# Patient Record
Sex: FEMALE | Race: WHITE | NOT HISPANIC OR LATINO | Employment: PART TIME | URBAN - METROPOLITAN AREA
[De-identification: names, ages, dates, MRNs, and addresses within clinical notes are randomized per-mention and may not be internally consistent; named-entity substitution may affect disease eponyms.]

---

## 2024-05-29 ENCOUNTER — VIRTUAL VISIT (OUTPATIENT)
Dept: BEHAVIORAL HEALTH | Facility: CLINIC | Age: 34
End: 2024-05-29
Payer: COMMERCIAL

## 2024-05-29 DIAGNOSIS — F31.81 BIPOLAR 2 DISORDER (H): Primary | ICD-10-CM

## 2024-05-29 PROCEDURE — 90791 PSYCH DIAGNOSTIC EVALUATION: CPT | Mod: 95 | Performed by: SOCIAL WORKER

## 2024-05-29 ASSESSMENT — COLUMBIA-SUICIDE SEVERITY RATING SCALE - C-SSRS
6. HAVE YOU EVER DONE ANYTHING, STARTED TO DO ANYTHING, OR PREPARED TO DO ANYTHING TO END YOUR LIFE?: NO
TOTAL  NUMBER OF INTERRUPTED ATTEMPTS LIFETIME: NO
2. HAVE YOU ACTUALLY HAD ANY THOUGHTS OF KILLING YOURSELF?: NO
ATTEMPT LIFETIME: NO
TOTAL  NUMBER OF ABORTED OR SELF INTERRUPTED ATTEMPTS LIFETIME: NO
1. HAVE YOU WISHED YOU WERE DEAD OR WISHED YOU COULD GO TO SLEEP AND NOT WAKE UP?: NO

## 2024-05-29 ASSESSMENT — ANXIETY QUESTIONNAIRES
GAD7 TOTAL SCORE: 1
GAD7 TOTAL SCORE: 1
2. NOT BEING ABLE TO STOP OR CONTROL WORRYING: NOT AT ALL
5. BEING SO RESTLESS THAT IT IS HARD TO SIT STILL: NOT AT ALL
IF YOU CHECKED OFF ANY PROBLEMS ON THIS QUESTIONNAIRE, HOW DIFFICULT HAVE THESE PROBLEMS MADE IT FOR YOU TO DO YOUR WORK, TAKE CARE OF THINGS AT HOME, OR GET ALONG WITH OTHER PEOPLE: NOT DIFFICULT AT ALL
6. BECOMING EASILY ANNOYED OR IRRITABLE: NOT AT ALL
3. WORRYING TOO MUCH ABOUT DIFFERENT THINGS: SEVERAL DAYS
7. FEELING AFRAID AS IF SOMETHING AWFUL MIGHT HAPPEN: NOT AT ALL
1. FEELING NERVOUS, ANXIOUS, OR ON EDGE: NOT AT ALL

## 2024-05-29 ASSESSMENT — PATIENT HEALTH QUESTIONNAIRE - PHQ9: 5. POOR APPETITE OR OVEREATING: NOT AT ALL

## 2024-05-29 NOTE — PROGRESS NOTES
[facility] Psychiatry Services [facility]         PATIENT'S NAME: Tomi Parada  PREFERRED NAME: Tomi  PRONOUNS:       MRN: [id number]  : 1990  ADDRESS: [address] [place] [id number]  [facility]  [id number]  DATE OF SERVICE: 24  START TIME: 315 pm  END TIME: 409 pm  PREFERRED PHONE: [phone]  May we leave a program related message: Yes  EMERGENCY CONTACT: was obtained Roni Irizarry (LATASHA)  SERVICE MODALITY:  this was supposed to be a video visit but there were tech issues so it was changed to a phone visit      Provider verified identity through the following two step process.  Patient provided:  Patient  and Patient address    Telemedicine Visit: The patient's condition can be safely assessed and treated via synchronous audio and visual telemedicine encounter.      Reason for Telemedicine Visit: Patient convenience (e.g. access to timely appointments / distance to available provider)    Originating Site (Patient Location): Patient's home    Distant Site (Provider Location): Provider Remote Setting- Home Office    Consent:  The patient/guardian has verbally consented to: the potential risks and benefits of telemedicine (video visit) versus in person care; bill my insurance or make self-payment for services provided; and responsibility for payment of non-covered services.     Patient would like the video invitation sent by:  My Chart    Mode of Communication:  Video Conference via Amwell    Distant Location (Provider):  Off-site    As the provider I attest to compliance with applicable laws and regulations related to telemedicine.    UNIVERSAL ADULT Mental Health DIAGNOSTIC ASSESSMENT    Identifying Information:  Patient is a 33 year old,    individual.  Patient was referred for an assessment by  [facility] .  Patient attended the session alone.     Chief Complaint:   The reason for seeking services at this time is: hx of bipolar  "disorder, lithium was low so provider increased it and changed the time pt was to take it. She was not taking the full dose because she didn't realize she needed to.  The problem(s) began in 2011. Patient has attempted to resolve these concerns in the past through outpt, hospitalized 10 years ago, 3 years ago and 05/2024, psychiatry with Jaylin Inman at [facility] reviewed assessment and the LOCUS with pt. She does not meet criteria for programmatic care. [facility] offered to make referral for counseling. Pt agreed.     Social/Family History:  Patient reported they grew up in  born in [place] and then moved to [place] when pt was 10 due to F's job. She returned to [place] in 12/2022 .  They were raised by biological parents.  Parents stayed ..Pt is an only child Patient reported that their childhood was \"loved, spoiled, supported\".  Patient described their current relationships with family of origin as good.      The patient describes their cultural background as middle [place].  Cultural influences and impact on patient's life structure, values, norms, and healthcare: none identified.  Contextual influences on patient's health include: none identified.  Cultural, Contextual, and socioeconomic factors do not affect the patient's access to services.  These factors will be addressed in the Preliminary Treatment plan.  Patient identified their preferred language to be English. Patient reported they do not  need the assistance of an  or other support involved in therapy.     Patient reported had no significant delays in developmental tasks.   Patient's highest education level was some college. Patient identified the following learning problems: none reported.  Modifications will not be used to assist communication in therapy.   Patient reports they are  able to understand written materials.    Patient reported the following relationship history LTR been together since 2013.  " Patient's current relationship status is partnered / significant other for 11 years.   Patient identified their sexual orientation as heterosexual.  Patient reported having zero child(ebonie). Patient identified partner, parents, and family, in laws  as part of their support system.  Patient identified the quality of these relationships as good.     Patient's current living/housing situation involves staying in own home/apartment.  They live with spouse, Dash and they report that housing is stable.     Patient is currently  umemployed. Had been working for [employer] but has been suspended due to a disagreement with a customer. She has appealed and hopes to have her account reinstated .  Patient reports their finances are obtained through spouse.  Patient does not identify finances as a current stressor.      Patient reported that they have not been involved with the legal system.   Patient denies being on probation / parole / under the jurisdiction of the court.    Patient's Strengths and Limitations:  Patient identified the following strengths or resources that will help them succeed in treatment: exercise routine, friends / good social support, family support, insight, intelligence, motivation, sober support group / recovery support , and strong social skills. Things that may interfere with the patient's success in treatment include: none identified.     Assessments:  The following assessments were completed by patient for this visit:  PHQ2:       5/29/2024     3:52 PM   PHQ-2 ( 1999 [employer])   Q1: Little interest or pleasure in doing things 1   Q2: Feeling down, depressed or hopeless 1   PHQ-2 Score 2     PHQ9:        No data to display              GAD2:        No data to display              GAD7:       5/29/2024     3:52 PM   KAHLIL-7 SCORE   Total Score 1     CAGE-AID:       5/29/2024     3:52 PM   CAGE-AID Total Score   Total Score 4     PROMIS 10-Global Health (all questions and answers displayed):        5/29/2024     3:52 PM   PROMIS 10   In general, would you say your health is: 2   In general, would you say your quality of life is: 4   In general, how would you rate your physical health? 2   In general, how would you rate your mental health, including your mood and your ability to think? 4   In general, how would you rate your satisfaction with your social activities and relationships? 2   In general, please rate how well you carry out your usual social activities and roles. (This includes activities at home, at work and in your community, and responsibilities as a parent, child, spouse, employee, friend, etc.) 4   To what extent are you able to carry out your everyday physical activities such as walking, climbing stairs, carrying groceries, or moving a chair? 2   In the past 7 days, how often have you been bothered by emotional problems such as feeling anxious, depressed, or irritable? 1   In the past 7 days, how would you rate your fatigue on average? 2   In the past 7 days, how would you rate your pain on average, where 0 means no pain, and 10 means worst imaginable pain? 3   Global Mental Health Score 15   Global Physical Health Score 12   PROMIS TOTAL - SUBSCORES 27     PROMIS 10-Global Health (only subscores and total score):       5/29/2024     3:52 PM   PROMIS-10 Scores Only   Global Mental Health Score 15   Global Physical Health Score 12   PROMIS TOTAL - SUBSCORES 27     [place] Suicide Severity Rating Scale (Lifetime/Recent)      5/29/2024     3:59 PM   [place] Suicide Severity Rating (Lifetime/Recent)   Q1 Wish to be Dead (Lifetime) N   Q2 Non-Specific Active Suicidal Thoughts (Lifetime) N   Actual Attempt (Lifetime) N   Has subject engaged in non-suicidal self-injurious behavior? (Lifetime) N   Interrupted Attempts (Lifetime) N   Aborted or Self-Interrupted Attempt (Lifetime) N   Preparatory Acts or Behavior (Lifetime) N   Calculated C-SSRS Risk Score (Lifetime/Recent) No Risk Indicated        Personal and Family Medical History:  Patient does report a family history of mental health concerns.  Patient reports family history is not on file..     Patient does report Mental Health Diagnosis and/or Treatment.  Patient reported the following previous diagnoses which include(s):   .  Patient reported symptoms began 2011.  Patient has received mental health services in the past:     .outpt, inpt  Psychiatric Hospitalizations:   when  10 years ago, 4 years ago and 05/2024. She was hospitalized for 10 days at [facility]   Patient denies a history of civil commitment. Pt was put on a hold in the ED but not committed.     Currently, patient    is receiving other mental health services.  These include  psychiatry with Jaylin Inman at [facility] via telehealth [facility] at [facility]       Patient has had a physical exam to rule out medical causes for current symptoms.  Date of last physical exam was within the past year. Client was encouraged to follow up with PCP if symptoms were to develop. The patient has a [employer] Primary Care Provider, who is named No Ref-Primary, Physician..  Patient reports no current medical concerns.  Patient denies any issues with pain..   There are not significant appetite / nutritional concerns / weight changes. These may include: no concerns. Patient reports the following sleep concerns:  No concerns.   Patient does not report a history of head injury / trauma / cognitive impairment.      Patient reports current meds as:   No current outpatient medications on file.     No current facility-administered medications for this visit.    Depakote 1500 mg  Lithuim 900 mg (extended release)    Medication Adherence:  Patient reports taking prescribed medications as prescribed.    Patient Allergies:  Not on File    Medical History:  No past medical history on file.      Current Mental Status Exam:   Appearance:  Unable to assess-phone    Eye  Contact:  Unable to assess-phone   Psychomotor:  Unable to assess-phone       Gait / station:  no problem  Attitude / Demeanor: Cooperative   Speech      Rate / Production: Pressured       Volume:  Normal  volume      Language:  intact  Mood:   Normal  Affect:   Appropriate    Thought Content: Clear   Thought Process: Coherent  Logical       Associations: No loosening of associations  Insight:   Good   Judgment:  Intact   Orientation:  All  Attention/concentration: Good    Substance Use:   Patient did not report a family history of substance use concerns; see medical history section for details.  Patient has received chemical dependency treatment in the past at [facility] for one year .  Patient has not ever been to detox.      Patient [facility] the following [facility] . Patient reported the following problems as a result of their substance use:  financial problems and relationship problems.    Patient + hx of alcohol abuse but quit drinking.  Patient denies using tobacco.  Patient denies using cannabis.  Patient denies using caffeine.  Patient reports using/abusing the following substance(s). 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    Significant Losses / Trauma / Abuse / Neglect Issues:   Patient   did not serve in the .  There are indications or report of significant loss, trauma, abuse or neglect issues related to: are no indications and client denies any losses, trauma, abuse, or neglect concerns.  Concerns for possible neglect are not present.     Safety Assessment:   Patient denies current homicidal ideation and behaviors.  Patient denies current self-injurious ideation and  behaviors.    Patient denied risk behaviors associated with substance use.   Patient denies any high risk behaviors associated with mental health symptoms.  Patient reports the following current concerns for their personal safety: None.  Patient reports there   no firearms in the house.       .    History of Safety Concerns:  Patient denied a history of homicidal ideation.     Patient denied a history of personal safety concerns.    Patient denied a history of assaultive behaviors.    Patient denied a history of sexual assault behaviors.     Patient denied a history of risk behaviors associated with substance use.  Patient denies any history of high risk behaviors associated with mental health symptoms.  Patient reports the following protective factors:      Risk Plan:  See Recommendations for Safety and Risk Management Plan    Review of Symptoms per patient report:   Depression: No symptoms  Judy:  Elevated mood, Grandiosity, Racing thoughts, Increased activity, Decreased need for sleep, Impulsiveness, and thought Anju was coming to play music with her, thinks crossword puzzles are for messages for her, gets paranoid  Psychosis: No Symptoms  Anxiety: No Symptoms  Panic:  No symptoms  Post Traumatic Stress Disorder:  No Symptoms   Eating Disorder: No Symptoms  ADD / ADHD:  No symptoms  Conduct Disorder: No symptoms  Autism Spectrum Disorder: No symptoms  Obsessive Compulsive Disorder: No Symptoms    Patient reports the following compulsive behaviors and treatment history:  none .      Diagnostic Criteria:   Bipolar II   A. A distinct period of abnormally and persistently elevated, expansive, or irritable mood and abnormally and persistently increased activity or energy lasting at least 4 consecutive days and presentmost of the day, nearly every day.  B. During the period of mood disturbance and increased energy and activity, three (or more) of the following symptoms have persisted (four if the mood is only  irritable), represent a nonticeable change from usual behaivor, and have been present to a degree:   - inflated self-esteem or grandiosity    - more talkative than usual or pressure to keep talking    - flight of ideas or subjective experience that thoughts are racing   - distractibility   - increase in goal-directed activity  C. The episode is associated with an unequivocal change in functioning that is uncharacteristic of the person when not symptomatic  D. The disturbance in mood and the change in functioning are observable by others  E. The episode is not severe enough to cause marked impairment in social or occupational functioning or to necessitate hospitalization, and does not have psychotic features.  F. The symptoms are not attributable to the direct physiological effects of a substance or a general medical condition    Functional Status:  Patient reports the following functional impairments:  management of the household and or completion of tasks, relationship(s), and work / vocational responsibilities.     Nonprogrammatic care:  Patient is requesting basic services to address current mental health concerns.    Clinical Summary:  1. 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  2. Principal DSM5 Diagnoses  (Sustained by DSM5 Criteria Listed Above):   296.89 Bipolar II Disorder Hypomanic.  3. Other Diagnoses that is relevant to services:   296.89 Bipolar II Disorder Hypomanic.  4. Provisional Diagnosis:  296.89 Bipolar II Disorder Hypomanic as evidenced by ROS, KAHLIL, PHQ, chart review and the interview.  .  5. Prognosis: Relieve Acute Symptoms.  6. Likely consequences of symptoms if not treated: Worsening symptoms and diminishing ability to function.  .  7. Client strengths include:  caring, goal-focused, good listener, has a previous history of therapy, insightful, intelligent, motivated, open to learning, and support of family, friends and providers .      Recommendations:     1. Plan for Safety and Risk Management:   Safety and Risk: Recommended that patient call 911 or go to the local ED should there be a change in any of these risk factors..          Report to child / adult protection services was NA.     2. Patient's identified mental health concerns with a cultural influence will be addressed by at the request of the pt .     3. Initial Treatment will focus on:    Mood Instability - hypomania .     4. Resources/Service Plan:    services are not indicated.   Modifications to assist communication are not indicated.   Additional disability accommodations are not indicated.      5. Collaboration:   Collaboration / coordination of treatment will be initiated with the following  support professionals:  NA .      6.  Referrals:   The following referral(s) will be initiated: Outpatient Mental Shane Therapy.       A Release of Information has been obtained for the following:  none .     Clinical Substantiation/medical necessity for the above recommendations:  see assessment.    7. BEL:    BEL:   pt is currently in BEL treatment . Provider gave patient printed information about the  effects of chemical use on their health and well being. Recommendations:  maintain sobriety .     8. Records:   These were reviewed at time of assessment.   Information in this assessment was obtained from the medical record and  provided by patient who is a good historian.    Patient will have open access to their mental health medical record.    9.   Interactive Complexity: No    10. Safety Plan:       Provider Name/ Credentials:  Carmen Carlson [facility]  She/her  Office hours: Tuesday-Friday 7:00 AM-5:00 PM   May 29, 2024    Crisis Resources    Refer to the resources below as needed.    Steps to care for yourself    If you are currently in counseling, call your counselor for an appointment  Call the local crisis resources below if needed.  Contact friends or family for  support.  Get more exercise.  Do activities you enjoy.  Eat a well-balanced diet and drink plenty of fluids.  Rest as needed.  Limit alcohol and recreational drugs. These can worsen depression.  Properly store or remove fire arms.     When to contact your primary care provider     You have thoughts of harming or killing yourself but have not made a plan to carry it out.  Your depression gets in the way of daily activities.  You are often unable to sleep.  You need help cutting back on alcohol or recreational drugs.    When to call 911 or go to the Emergency Room     Get emergency help right away if you have any of the following:  You are planning to harm or kill yourself and you have a way to carry out the plan.   You have injured yourself or others. Or, you think you will.  You feel confused or are having trouble thinking or remembering.  You are having delusions (false beliefs).  You are hearing voices or seeing things that aren t there.  You are feeling psychotic (paranoid, fearful, restless, agitated, nervous, racing thoughts or speech)    Crisis Resources   The EmPath is an adults only unit located at [facility] is a short term (generally less than 23 hour stay) designed for crisis intervention and stabilization. Pts have the opportunity to meet quickly with a behavioral health team for evaluation in a calm and peaceful therapuetic environment. To be evaluated for admission pts are triaged throught the [facility] ED.     The Behavioral Evaluation Center (BEC) is located at the [facility].The BEC is open to ages and provides a comprehensive behavioral health evaluation to those in crisis. Patients typically stay 24-36 hours.     The following hotlines are for both adults and children. The and are open 24 hours a day, 7 days a week unless noted otherwise.      Crisis Lines        Gambling Hotline  0.338.006.9378 [hope]        línea de crisis española   903.383.4355        [facility] & MobileVeda Helpline  [phone]        National Hope Line  1.827.945.9485 [hope]        National Suicide Prevention Lifeline: text 988        The [employer] Project (LGBTQ Youth Crisis Line)  9.382.850.7352  text START to 477-457        's Crisis Line  0.530.613.6031 (Press 1)  or text 577376        [facility] Crisis  [phone]      [facility] Mobile Crisis  [phone]      [facility] Crisis  [phone]      [facility] Mobile Crisis  [phone] (adults)  [phone] (children)      [facility] Mobile Crisis  [phone] (adults)  [phone] (children)      [facility] Mobile Crisis  [phone]      [facility] Mobile Crisis  [phone]    Community Resources      Fast Tracker  Linking people to mental health and substance use disorder resources  fasttrackermn.org        National Freeman on Mental Illness (KATHY)  785.179.2499 or 1.888.KATHY.HELPS  https://namimn.org/        [facility] Ur[facility] for Adult Mental Health  [facility] residents [address]  [phone]        [facility]  Free mental health counseling  https://walkin.org/  612.870.0565 X2    Mental Health Apps      Calm Harm  https://calmharm.co.uk/      My3  https://my3app.org/      Aida Safety Plan  https://www.mysafetyplan.org/